# Patient Record
Sex: MALE | Race: WHITE | NOT HISPANIC OR LATINO | Employment: FULL TIME | URBAN - METROPOLITAN AREA
[De-identification: names, ages, dates, MRNs, and addresses within clinical notes are randomized per-mention and may not be internally consistent; named-entity substitution may affect disease eponyms.]

---

## 2021-08-20 ENCOUNTER — HOSPITAL ENCOUNTER (EMERGENCY)
Facility: HOSPITAL | Age: 36
Discharge: HOME | End: 2021-08-20
Attending: STUDENT IN AN ORGANIZED HEALTH CARE EDUCATION/TRAINING PROGRAM | Admitting: STUDENT IN AN ORGANIZED HEALTH CARE EDUCATION/TRAINING PROGRAM
Payer: COMMERCIAL

## 2021-08-20 ENCOUNTER — APPOINTMENT (OUTPATIENT)
Dept: RADIOLOGY | Facility: HOSPITAL | Age: 36
End: 2021-08-20
Attending: EMERGENCY MEDICINE
Payer: COMMERCIAL

## 2021-08-20 VITALS
SYSTOLIC BLOOD PRESSURE: 131 MMHG | WEIGHT: 233 LBS | HEIGHT: 76 IN | DIASTOLIC BLOOD PRESSURE: 83 MMHG | RESPIRATION RATE: 16 BRPM | TEMPERATURE: 97 F | BODY MASS INDEX: 28.37 KG/M2 | HEART RATE: 54 BPM | OXYGEN SATURATION: 99 %

## 2021-08-20 DIAGNOSIS — R07.9 CHEST PAIN, UNSPECIFIED TYPE: ICD-10-CM

## 2021-08-20 DIAGNOSIS — R00.2 PALPITATIONS: ICD-10-CM

## 2021-08-20 LAB
ANION GAP SERPL CALCULATED.3IONS-SCNC: 8 MMOL/L (ref 5–18)
ATRIAL RATE - MUSE: 51 BPM
BASOPHILS # BLD AUTO: 0.1 10E3/UL (ref 0–0.2)
BASOPHILS NFR BLD AUTO: 1 %
BUN SERPL-MCNC: 14 MG/DL (ref 8–22)
CALCIUM SERPL-MCNC: 9.1 MG/DL (ref 8.5–10.5)
CHLORIDE BLD-SCNC: 109 MMOL/L (ref 98–107)
CO2 SERPL-SCNC: 25 MMOL/L (ref 22–31)
CREAT SERPL-MCNC: 0.97 MG/DL (ref 0.7–1.3)
DIASTOLIC BLOOD PRESSURE - MUSE: NORMAL MMHG
EOSINOPHIL # BLD AUTO: 0.4 10E3/UL (ref 0–0.7)
EOSINOPHIL NFR BLD AUTO: 5 %
ERYTHROCYTE [DISTWIDTH] IN BLOOD BY AUTOMATED COUNT: 12.2 % (ref 10–15)
GFR SERPL CREATININE-BSD FRML MDRD: >90 ML/MIN/1.73M2
GLUCOSE BLD-MCNC: 89 MG/DL (ref 70–125)
HCT VFR BLD AUTO: 49.9 % (ref 40–53)
HGB BLD-MCNC: 16.6 G/DL (ref 13.3–17.7)
IMM GRANULOCYTES # BLD: 0 10E3/UL
IMM GRANULOCYTES NFR BLD: 0 %
INTERPRETATION ECG - MUSE: NORMAL
LYMPHOCYTES # BLD AUTO: 2.9 10E3/UL (ref 0.8–5.3)
LYMPHOCYTES NFR BLD AUTO: 41 %
MCH RBC QN AUTO: 30.6 PG (ref 26.5–33)
MCHC RBC AUTO-ENTMCNC: 33.3 G/DL (ref 31.5–36.5)
MCV RBC AUTO: 92 FL (ref 78–100)
MONOCYTES # BLD AUTO: 0.9 10E3/UL (ref 0–1.3)
MONOCYTES NFR BLD AUTO: 13 %
NEUTROPHILS # BLD AUTO: 2.8 10E3/UL (ref 1.6–8.3)
NEUTROPHILS NFR BLD AUTO: 40 %
NRBC # BLD AUTO: 0 10E3/UL
NRBC BLD AUTO-RTO: 0 /100
P AXIS - MUSE: 19 DEGREES
PLATELET # BLD AUTO: 227 10E3/UL (ref 150–450)
POTASSIUM BLD-SCNC: 4.5 MMOL/L (ref 3.5–5)
PR INTERVAL - MUSE: 154 MS
QRS DURATION - MUSE: 108 MS
QT - MUSE: 412 MS
QTC - MUSE: 379 MS
R AXIS - MUSE: 43 DEGREES
RBC # BLD AUTO: 5.42 10E6/UL (ref 4.4–5.9)
SODIUM SERPL-SCNC: 142 MMOL/L (ref 136–145)
SYSTOLIC BLOOD PRESSURE - MUSE: NORMAL MMHG
T AXIS - MUSE: 43 DEGREES
TROPONIN I SERPL-MCNC: 0.01 NG/ML (ref 0–0.29)
VENTRICULAR RATE- MUSE: 51 BPM
WBC # BLD AUTO: 7 10E3/UL (ref 4–11)

## 2021-08-20 PROCEDURE — 80048 BASIC METABOLIC PNL TOTAL CA: CPT | Performed by: STUDENT IN AN ORGANIZED HEALTH CARE EDUCATION/TRAINING PROGRAM

## 2021-08-20 PROCEDURE — 36415 COLL VENOUS BLD VENIPUNCTURE: CPT | Performed by: EMERGENCY MEDICINE

## 2021-08-20 PROCEDURE — 93005 ELECTROCARDIOGRAM TRACING: CPT | Performed by: EMERGENCY MEDICINE

## 2021-08-20 PROCEDURE — 93005 ELECTROCARDIOGRAM TRACING: CPT | Performed by: STUDENT IN AN ORGANIZED HEALTH CARE EDUCATION/TRAINING PROGRAM

## 2021-08-20 PROCEDURE — 85025 COMPLETE CBC W/AUTO DIFF WBC: CPT | Performed by: STUDENT IN AN ORGANIZED HEALTH CARE EDUCATION/TRAINING PROGRAM

## 2021-08-20 PROCEDURE — 85025 COMPLETE CBC W/AUTO DIFF WBC: CPT | Performed by: EMERGENCY MEDICINE

## 2021-08-20 PROCEDURE — 71046 X-RAY EXAM CHEST 2 VIEWS: CPT

## 2021-08-20 PROCEDURE — 84484 ASSAY OF TROPONIN QUANT: CPT | Performed by: STUDENT IN AN ORGANIZED HEALTH CARE EDUCATION/TRAINING PROGRAM

## 2021-08-20 PROCEDURE — 84484 ASSAY OF TROPONIN QUANT: CPT | Performed by: EMERGENCY MEDICINE

## 2021-08-20 PROCEDURE — 99285 EMERGENCY DEPT VISIT HI MDM: CPT | Mod: 25

## 2021-08-20 PROCEDURE — 80048 BASIC METABOLIC PNL TOTAL CA: CPT | Performed by: EMERGENCY MEDICINE

## 2021-08-20 ASSESSMENT — ENCOUNTER SYMPTOMS
DIZZINESS: 1
PALPITATIONS: 1
FEVER: 0
COUGH: 0
DIAPHORESIS: 1
SHORTNESS OF BREATH: 0
HEADACHES: 1

## 2021-08-20 ASSESSMENT — MIFFLIN-ST. JEOR: SCORE: 2093.38

## 2021-08-20 NOTE — ED PROVIDER NOTES
EMERGENCY DEPARTMENT ENCOUNTER      NAME: Hai Gamez  AGE: 36 year old male  YOB: 1985  MRN: [id number]  EVALUATION DATE & TIME: No admission date for patient encounter.    PCP: No Ref-Primary, Physician    ED PROVIDER: Binta De Souza MD    Chief Complaint   Patient presents with     Palpitations     Chest Pain     FINAL IMPRESSION:  1. Palpitations    2. Chest pain, unspecified type      ED COURSE & MEDICAL DECISION MAKIN year old old male who presents to the ED for evaluation of palpitations and chest pain.   History and physical examination as documented. Vital signs reassuring.  Woke up with palpitations and also had some left sided chest pain. This resolved without any intervention and he is feeling better. Did not have concurrent dyspnea. No syncope. No PE risk factors. PERC negative. Labs were ordered in triage. CBC and BMP are normal. No electrolyte or metabolic derangement. Troponin was normal. I do not suspect ACS and delta troponin is not indicated. EKG is normal. CXR with no focal process. Not consistent with aortic dissection. Possible that this was related to anxiety. We do not have a clear cause for his symptoms but with normal workup here and improving without intervention, I do not think he has an emergent process. He can f/u with primary care and he is agreeable with this plan. He was given return precautions.   Scribe time stamps  8:35 AM I met with the patient for the initial interview and physical examination. Discussed plan for treatment and workup in the ED.   9:00 AM Discussed with the patient and all questioned fully answered. He will call me if any problems arise.       PPE worn:   Mask: surgical    MEDICATIONS GIVEN IN THE EMERGENCY:  Medications - No data to display    NEW PRESCRIPTIONS STARTED AT TODAY'S ER VISIT  There are no discharge medications for this patient.         =================================================================    HPI    Patient  information was obtained from: Patient    Use of : N/A    Hai Gamez is a 36 year old male with a pertinent history of dyslipidemia and sinus bradycardia who presents to this ED private car for evaluation of palpitations and chest pain.    This morning (8/20), the patient had a dream that his heart was coming out of his chest, and woke up with the same persistent heart palpitations. He got up to go to the restroom, and experienced cold sweats, shooting pain to his left arm, and pain through his head. The patient waited for 5 minutes to see if the pain would subside, and when it did not, he was prompted to present to the ED. Currently, he feels a lot better and is just experiencing slight pain behind the temple and random discomfort behind his heart. The patient has no history of heart issues, but he stated that 10 years ago he had pericarditis. The patient denies history of blood clots or long distant travel. About 2-3 months ago, he woke up with leg pain and thought it was blood clots, but non were identified. He endorses dizziness and slight tunnel vision. He denies shortness of breath, any other breathing problems, cough, fever, or any other complaints at this time.    REVIEW OF SYSTEMS   Review of Systems   Constitutional: Positive for diaphoresis (cold sweats (resolved)). Negative for fever.   Respiratory: Negative for cough and shortness of breath.    Cardiovascular: Positive for chest pain and palpitations.   Musculoskeletal:        Positive for pain to left arm (resolved)   Neurological: Positive for dizziness and headaches.        PAST MEDICAL HISTORY:  No past medical history on file.    PAST SURGICAL HISTORY:  No past surgical history on file.    ALLERGIES:  No Known Allergies    FAMILY HISTORY:  No family history on file.    SOCIAL HISTORY:   Social History     Socioeconomic History     Marital status:      Spouse name: Not on file     Number of children: Not on file     Years of  "education: Not on file     Highest education level: Not on file   Occupational History     Not on file   Tobacco Use     Smoking status: Not on file   Substance and Sexual Activity     Alcohol use: Not on file     Drug use: Not on file     Sexual activity: Not on file   Other Topics Concern     Not on file   Social History Narrative     Not on file     Social Determinants of Health     Financial Resource Strain:      Difficulty of Paying Living Expenses:    Food Insecurity:      Worried About Running Out of Food in the Last Year:      Ran Out of Food in the Last Year:    Transportation Needs:      Lack of Transportation (Medical):      Lack of Transportation (Non-Medical):    Physical Activity:      Days of Exercise per Week:      Minutes of Exercise per Session:    Stress:      Feeling of Stress :    Social Connections:      Frequency of Communication with Friends and Family:      Frequency of Social Gatherings with Friends and Family:      Attends [employer] Services:      Active Member of Clubs or Organizations:      Attends Club or Organization Meetings:      Marital Status:    Intimate Partner Violence:      Fear of Current or Ex-Partner:      Emotionally Abused:      Physically Abused:      Sexually Abused:        VITALS:  /83   Pulse 54   Temp 97  F (36.1  C)   Resp 16   Ht 1.93 m (6' 4\")   Wt 105.7 kg (233 lb)   SpO2 99%   BMI 28.36 kg/m      PHYSICAL EXAM    General: No acute distress.  HEENT: No external signs of trauma. No scleral icterus. Oropharynx clear and moist.  Chest/Pulm: No tenderness to palpation. Lungs clear to auscultation bilaterally.  CV: Regular rate and rhythm without murmurs. Regular.  Abdomen: Soft and non-distended without tenderness to palpation.  MSK/Extremities: Actively moving all four extremities. No pedal edema.  Skin: No visible rashes  Neuro: Alert and conversant.   Psych: Behavior normal.    LAB:  All pertinent labs reviewed and interpreted.  Results for orders " placed or performed during the hospital encounter of 08/20/21   Chest XR,  PA & LAT    Impression    IMPRESSION: Negative chest.   Basic metabolic panel   Result Value Ref Range    Sodium 142 136 - 145 mmol/L    Potassium 4.5 3.5 - 5.0 mmol/L    Chloride 109 (H) 98 - 107 mmol/L    Carbon Dioxide (CO2) 25 22 - 31 mmol/L    Anion Gap 8 5 - 18 mmol/L    Urea Nitrogen 14 8 - 22 mg/dL    Creatinine 0.97 0.70 - 1.30 mg/dL    Calcium 9.1 8.5 - 10.5 mg/dL    Glucose 89 70 - 125 mg/dL    GFR Estimate >90 >60 mL/min/1.73m2   Troponin I (now)   Result Value Ref Range    Troponin I 0.01 0.00 - 0.29 ng/mL   CBC with platelets and differential   Result Value Ref Range    WBC Count 7.0 4.0 - 11.0 10e3/uL    RBC Count 5.42 4.40 - 5.90 10e6/uL    Hemoglobin 16.6 13.3 - 17.7 g/dL    Hematocrit 49.9 40.0 - 53.0 %    MCV 92 78 - 100 fL    MCH 30.6 26.5 - 33.0 pg    MCHC 33.3 31.5 - 36.5 g/dL    RDW 12.2 10.0 - 15.0 %    Platelet Count 227 150 - 450 10e3/uL    % Neutrophils 40 %    % Lymphocytes 41 %    % Monocytes 13 %    % Eosinophils 5 %    % Basophils 1 %    % Immature Granulocytes 0 %    NRBCs per 100 WBC 0 <1 /100    Absolute Neutrophils 2.8 1.6 - 8.3 10e3/uL    Absolute Lymphocytes 2.9 0.8 - 5.3 10e3/uL    Absolute Monocytes 0.9 0.0 - 1.3 10e3/uL    Absolute Eosinophils 0.4 0.0 - 0.7 10e3/uL    Absolute Basophils 0.1 0.0 - 0.2 10e3/uL    Absolute Immature Granulocytes 0.0 <=0.0 10e3/uL    Absolute NRBCs 0.0 10e3/uL   ECG 12-LEAD WITH MUSE (LHE)   Result Value Ref Range    Systolic Blood Pressure  mmHg    Diastolic Blood Pressure  mmHg    Ventricular Rate 51 BPM    Atrial Rate 51 BPM    NH Interval 154 ms    QRS Duration 108 ms     ms    QTc 379 ms    P Axis 19 degrees    R AXIS 43 degrees    T Axis 43 degrees    Interpretation ECG       Sinus bradycardia  Otherwise normal ECG  No previous ECGs available  Confirmed by SEE ED PROVIDER NOTE FOR, ECG INTERPRETATION ([id number]),  JIMMIE RINCON ([id number]) on 8/20/2021 10:35:45  AM         RADIOLOGY:  Reviewed all pertinent imaging. Please see official radiology report.  Chest XR,  PA & LAT   Final Result   IMPRESSION: Negative chest.          EKG:    Performed at: 0509    Impression: EKG shows sinus rhythm. Bradycardic. No ST elevation or depression. Normal intervals.     Rate: 51  NV Interval: 154  QRS Interval: 108  QTc Interval: 379    I have independently reviewed and interpreted the EKG(s) documented above.    PROCEDURES:   none    I, Rose Mary Perez, am serving as a scribe to document services personally performed by Dr. Binta De Souza based on my observation and the provider's statements to me. I, Binta De Sozua MD attest that Rose Mary Perez is acting in a scribe capacity, has observed my performance of the services and has documented them in accordance with my direction.    Binta De Souza M.D.  Emergency Medicine  [facility]       Binta De Souza MD  08/20/21 5027

## 2021-08-20 NOTE — DISCHARGE INSTRUCTIONS
Your labs, EKG, and chest x-ray were all normal. Your symptoms are not consistent with a heart attack and your blood work and EKG support that. If these symptoms are persistent you should definitely follow up with primary care.     Return to the ER for any new/concerning/worsening symptoms.

## 2021-10-17 ENCOUNTER — HEALTH MAINTENANCE LETTER (OUTPATIENT)
Age: 36
End: 2021-10-17

## 2022-10-03 ENCOUNTER — HEALTH MAINTENANCE LETTER (OUTPATIENT)
Age: 37
End: 2022-10-03

## 2023-02-11 ENCOUNTER — HEALTH MAINTENANCE LETTER (OUTPATIENT)
Age: 38
End: 2023-02-11

## 2023-11-06 ENCOUNTER — HOSPITAL ENCOUNTER (EMERGENCY)
Facility: CLINIC | Age: 38
Discharge: HOME | End: 2023-11-07
Attending: EMERGENCY MEDICINE | Admitting: EMERGENCY MEDICINE
Payer: COMMERCIAL

## 2023-11-06 ENCOUNTER — APPOINTMENT (OUTPATIENT)
Dept: GENERAL RADIOLOGY | Facility: CLINIC | Age: 38
End: 2023-11-06
Attending: EMERGENCY MEDICINE
Payer: COMMERCIAL

## 2023-11-06 VITALS
DIASTOLIC BLOOD PRESSURE: 87 MMHG | TEMPERATURE: 97.1 F | RESPIRATION RATE: 16 BRPM | SYSTOLIC BLOOD PRESSURE: 143 MMHG | OXYGEN SATURATION: 98 % | HEART RATE: 50 BPM

## 2023-11-06 DIAGNOSIS — R03.0 ELEVATED BLOOD PRESSURE READING WITHOUT DIAGNOSIS OF HYPERTENSION: ICD-10-CM

## 2023-11-06 DIAGNOSIS — R07.9 CHEST PAIN, UNSPECIFIED TYPE: ICD-10-CM

## 2023-11-06 LAB
ANION GAP SERPL CALCULATED.3IONS-SCNC: 9 MMOL/L (ref 7–15)
BASOPHILS # BLD AUTO: 0.1 10E3/UL (ref 0–0.2)
BASOPHILS NFR BLD AUTO: 1 %
BUN SERPL-MCNC: 14.2 MG/DL (ref 6–20)
CALCIUM SERPL-MCNC: 9.3 MG/DL (ref 8.6–10)
CHLORIDE SERPL-SCNC: 103 MMOL/L (ref 98–107)
CREAT SERPL-MCNC: 1.01 MG/DL (ref 0.67–1.17)
DEPRECATED HCO3 PLAS-SCNC: 28 MMOL/L (ref 22–29)
EGFRCR SERPLBLD CKD-EPI 2021: >90 ML/MIN/1.73M2
EOSINOPHIL # BLD AUTO: 0.3 10E3/UL (ref 0–0.7)
EOSINOPHIL NFR BLD AUTO: 4 %
ERYTHROCYTE [DISTWIDTH] IN BLOOD BY AUTOMATED COUNT: 12 % (ref 10–15)
GLUCOSE SERPL-MCNC: 89 MG/DL (ref 70–99)
HCT VFR BLD AUTO: 46.6 % (ref 40–53)
HGB BLD-MCNC: 15.9 G/DL (ref 13.3–17.7)
IMM GRANULOCYTES # BLD: 0 10E3/UL
IMM GRANULOCYTES NFR BLD: 0 %
LYMPHOCYTES # BLD AUTO: 3.3 10E3/UL (ref 0.8–5.3)
LYMPHOCYTES NFR BLD AUTO: 43 %
MCH RBC QN AUTO: 31.1 PG (ref 26.5–33)
MCHC RBC AUTO-ENTMCNC: 34.1 G/DL (ref 31.5–36.5)
MCV RBC AUTO: 91 FL (ref 78–100)
MONOCYTES # BLD AUTO: 0.9 10E3/UL (ref 0–1.3)
MONOCYTES NFR BLD AUTO: 12 %
NEUTROPHILS # BLD AUTO: 3.1 10E3/UL (ref 1.6–8.3)
NEUTROPHILS NFR BLD AUTO: 40 %
NRBC # BLD AUTO: 0 10E3/UL
NRBC BLD AUTO-RTO: 0 /100
PLATELET # BLD AUTO: 234 10E3/UL (ref 150–450)
POTASSIUM SERPL-SCNC: 4.5 MMOL/L (ref 3.4–5.3)
RBC # BLD AUTO: 5.11 10E6/UL (ref 4.4–5.9)
SODIUM SERPL-SCNC: 140 MMOL/L (ref 135–145)
TROPONIN T SERPL HS-MCNC: 6 NG/L
WBC # BLD AUTO: 7.6 10E3/UL (ref 4–11)

## 2023-11-06 PROCEDURE — 85025 COMPLETE CBC W/AUTO DIFF WBC: CPT | Performed by: EMERGENCY MEDICINE

## 2023-11-06 PROCEDURE — 80048 BASIC METABOLIC PNL TOTAL CA: CPT | Performed by: EMERGENCY MEDICINE

## 2023-11-06 PROCEDURE — 99285 EMERGENCY DEPT VISIT HI MDM: CPT | Mod: 25

## 2023-11-06 PROCEDURE — 93005 ELECTROCARDIOGRAM TRACING: CPT

## 2023-11-06 PROCEDURE — 71046 X-RAY EXAM CHEST 2 VIEWS: CPT

## 2023-11-06 PROCEDURE — 36415 COLL VENOUS BLD VENIPUNCTURE: CPT | Performed by: EMERGENCY MEDICINE

## 2023-11-06 PROCEDURE — 84484 ASSAY OF TROPONIN QUANT: CPT | Performed by: EMERGENCY MEDICINE

## 2023-11-07 LAB
ATRIAL RATE - MUSE: 57 BPM
DIASTOLIC BLOOD PRESSURE - MUSE: NORMAL MMHG
INTERPRETATION ECG - MUSE: NORMAL
P AXIS - MUSE: 12 DEGREES
PR INTERVAL - MUSE: 138 MS
QRS DURATION - MUSE: 108 MS
QT - MUSE: 402 MS
QTC - MUSE: 391 MS
R AXIS - MUSE: 44 DEGREES
SYSTOLIC BLOOD PRESSURE - MUSE: NORMAL MMHG
T AXIS - MUSE: 40 DEGREES
TROPONIN T SERPL HS-MCNC: <6 NG/L
VENTRICULAR RATE- MUSE: 57 BPM

## 2023-11-07 PROCEDURE — 84484 ASSAY OF TROPONIN QUANT: CPT | Performed by: EMERGENCY MEDICINE

## 2023-11-07 PROCEDURE — 36415 COLL VENOUS BLD VENIPUNCTURE: CPT | Performed by: EMERGENCY MEDICINE

## 2023-11-07 RX ORDER — ONDANSETRON 2 MG/ML
4 INJECTION INTRAMUSCULAR; INTRAVENOUS ONCE
Status: COMPLETED | OUTPATIENT
Start: 2023-11-07 | End: 2023-11-07

## 2023-11-07 RX ORDER — MAGNESIUM HYDROXIDE/ALUMINUM HYDROXICE/SIMETHICONE 120; 1200; 1200 MG/30ML; MG/30ML; MG/30ML
15 SUSPENSION ORAL ONCE
Status: COMPLETED | OUTPATIENT
Start: 2023-11-07 | End: 2023-11-07

## 2023-11-07 ASSESSMENT — ACTIVITIES OF DAILY LIVING (ADL): ADLS_ACUITY_SCORE: 35

## 2023-11-07 NOTE — DISCHARGE INSTRUCTIONS
If the medications here improve your symptoms I would consider use of Tums and Maalox at home.  Consider Tylenol as the area is tender which can indicate a musculoskeletal problem.  If you have worsening or change in your pain (moving to your back, for example) or if you develop new symptoms you need to return to the emergency department.  Please call your primary care provider to follow-up on your pain and also recheck your blood pressure.

## 2023-11-07 NOTE — ED TRIAGE NOTES
Mid sternal chest pain that radiates to left shoulder. Started today. Patient developed dizziness this evening which prompted his visit to the ER. Denies shortness of breath, previous heart history.

## 2023-11-07 NOTE — ED PROVIDER NOTES
"  History     Chief Complaint:  Chest Pain and Shoulder Pain    The history is provided by the patient.      Hai Gamez is a 38 year old male presenting for evaluation of chest pain and shoulder pain.  After a short walk about 8 hours prior to arrival, he developed chest pain that moves towards his left shoulder.  It was initially a discomfort described as a constant pressure, but after dinner he noticed it was more sharp and his right leg \"clinched\" which prompted his visit.  He denies any burning chest pain or a tearing sensation.  He does have a history of acid reflux.  He has some associated dizziness and nausea but no diaphoresis or vomiting.  The pain does not move to his back or abdomen.  He intermittently feels short of breath.  He did not have improvement with Tylenol.  He denies any recent surgeries outside of [facility] and no recent travel.  He denies leg pain or swelling.  He tries to stay active with 12,000-15,000 steps per day.  He has young children and typically carries them with his left upper extremity.  He does not have any known arm or shoulder injuries.  He does mention his sister had a stent placed in her 30s.    Independent Historian:   As above.    Review of External Notes:   The patient was seen by primary care 10/26/23 for routine health maintenance.     Medications:    The patient has no known daily or prescription medications.    Past Medical History:    Hypertension  Hyperlipidemia    Past Surgical History:    [place] teeth extraction  Tympanostomy tube placement  Eye surgery    Physical Exam   Patient Vitals for the past 24 hrs:   BP Temp Temp src Pulse Resp SpO2   11/06/23 2339 -- -- -- -- -- 98 %   11/06/23 2338 (!) 143/87 -- -- 50 -- --   11/06/23 1954 (!) 140/98 -- -- -- -- --   11/06/23 1952 -- 97.1  F (36.2  C) Temporal 54 16 99 %     Physical Exam  General: Well-developed and well-nourished. Well appearing young  man. " Cooperative.  Head:  Atraumatic.  Eyes:  Conjunctivae, lids, and sclerae are normal.  ENT:    Normal nose. Moist mucous membranes.  Neck:  Supple. Normal range of motion.  CV:  Bradycardic rate and regular rhythm. Normal heart sounds with no murmurs, rubs, or gallops detected.  2+ radial pulse bilaterally.  Resp:  No respiratory distress. Clear to auscultation bilaterally without decreased breath sounds, wheezing, rales, or rhonchi.  GI:  Soft. Non-distended. Non-tender.    MS:  Normal ROM.  Tenderness palpation across the chest wall and toward the anterior left shoulder.  Skin:  Warm. Non-diaphoretic. No pallor.  Neuro:  Awake. A&Ox3. Normal strength.  Psych: Normal mood and affect. Normal speech.  Vitals reviewed.    Emergency Department Course   EKG  Indication: chest pain  Time: 1958  Rate 57 bpm. ME interval 138. QRS duration 108. QT/QTc 402/391.   Sinus bradycardia  Otherwise normal ECG   No acute ST changes.  No significant change as compared to prior, dated 8/20/21.    Imaging:  XR Chest 2 Views   Final Result   IMPRESSION: Negative chest.         Laboratory:  Labs Ordered and Resulted from Time of ED Arrival to Time of ED Departure   BASIC METABOLIC PANEL - Normal       Result Value    Sodium 140      Potassium 4.5      Chloride 103      Carbon Dioxide (CO2) 28      Anion Gap 9      Urea Nitrogen 14.2      Creatinine 1.01      GFR Estimate >90      Calcium 9.3      Glucose 89     TROPONIN T, HIGH SENSITIVITY - Normal    Troponin T, High Sensitivity 6     TROPONIN T, HIGH SENSITIVITY - Normal    Troponin T, High Sensitivity <6     CBC WITH PLATELETS AND DIFFERENTIAL    WBC Count 7.6      RBC Count 5.11      Hemoglobin 15.9      Hematocrit 46.6      MCV 91      MCH 31.1      MCHC 34.1      RDW 12.0      Platelet Count 234      % Neutrophils 40      % Lymphocytes 43      % Monocytes 12      % Eosinophils 4      % Basophils 1      % Immature Granulocytes 0      NRBCs per 100 WBC 0      Absolute Neutrophils 3.1       Absolute Lymphocytes 3.3      Absolute Monocytes 0.9      Absolute Eosinophils 0.3      Absolute Basophils 0.1      Absolute Immature Granulocytes 0.0      Absolute NRBCs 0.0       Emergency Department Course & Assessments:  Assessments:  0137 I obtained history and examined the patient as noted above. I discussed findings and discharge with the patient. All questions answered.     Independent Interpretation (X-rays, CTs, rhythm strip):  I independently interpreted the patient's chest X-ray and note a normal mediastinum and no pneumothorax. Agree with the radiologist.    Consultations/Discussion of Management or Tests:  Not applicable.    Social Determinants of Health affecting care:    with children.   Established care provider.   Employed.   Expresses stressors, including grief.    Disposition:  The patient was discharged.     Impression & Plan    Medical Decision Making:  Hai is a 38 year old man presenting with over 8 hours of chest pain radiating to his left shoulder that is a constant pressure pain but intermittently sharp.  He also has mild symptoms of dizziness, nausea, and shortness of breath without any other concerns.  He mentions a history of acid reflux but no burning chest pain.  He does admit that he carries his children with his left upper extremity and also admits that he has been under stress due to grief.  He appears well on exam with moderate hypertension.  He remarks his blood pressure was found to be elevated recently but he has not yet been started on any antihypertensives.  He has tenderness across his chest wall and anterior left shoulder.    EKG is reassuring without acute ST changes or arrhythmias.  Initial and repeat troponin are both normal.  This essentially rules out ACS.  Historical features are not consistent with pulmonary embolism.  I doubt aortic catastrophe.  Chest ray is reassuring without pathology which would explain his pain such as pneumothorax or pneumonia.   Laboratory studies are reassuring.    I have suspicion this could be acid reflux although the palpable tenderness on his chest wall would suggest a musculoskeletal cause.  He is appropriate for discharge and can consider antacids.  I also recommended he consider Tylenol given the tenderness.  We discussed low threshold for return if he has worsening or change in his symptoms or new concerns and otherwise I encouraged him to follow-up with his primary care provider.  He understands his blood pressure here is elevated and this needs to be rechecked to see if he needs to be started on antihypertensives.  All questions answered.  Amenable to discharge.    Diagnosis:    ICD-10-CM    1. Chest pain, unspecified type  R07.9       2. Elevated blood pressure reading without diagnosis of hypertension  R03.0         Scribe Disclosure:  I, Beto Pritchard, am serving as a scribe at 12:22 AM on 11/7/2023 to document services personally performed by Margarita Richardson MD based on my observations and the provider's statements to me.   11/7/2023   Margarita Richardson MD Dixson, Kylie S, MD  11/19/23 0552

## 2024-03-09 ENCOUNTER — HEALTH MAINTENANCE LETTER (OUTPATIENT)
Age: 39
End: 2024-03-09

## 2025-03-16 ENCOUNTER — HEALTH MAINTENANCE LETTER (OUTPATIENT)
Age: 40
End: 2025-03-16